# Patient Record
Sex: FEMALE | Race: WHITE | NOT HISPANIC OR LATINO | Employment: UNEMPLOYED | URBAN - METROPOLITAN AREA
[De-identification: names, ages, dates, MRNs, and addresses within clinical notes are randomized per-mention and may not be internally consistent; named-entity substitution may affect disease eponyms.]

---

## 2019-07-25 ENCOUNTER — NON-PROVIDER VISIT (OUTPATIENT)
Dept: URGENT CARE | Facility: CLINIC | Age: 34
End: 2019-07-25

## 2019-07-25 DIAGNOSIS — Z02.1 PRE-EMPLOYMENT DRUG SCREENING: Primary | ICD-10-CM

## 2019-07-25 LAB
AMP AMPHETAMINE: NORMAL
COC COCAINE: NORMAL
INT CON NEG: NORMAL
INT CON POS: NORMAL
MET METHAMPHETAMINES: NORMAL
OPI OPIATES: NORMAL
PCP PHENCYCLIDINE: NORMAL
POC DRUG COMMENT 753798-OCCUPATIONAL HEALTH: NORMAL
THC: POSITIVE

## 2019-07-25 PROCEDURE — 80305 DRUG TEST PRSMV DIR OPT OBS: CPT | Performed by: FAMILY MEDICINE

## 2019-07-25 PROCEDURE — 8279 PR URINE 6 PANEL - SEND TO LAB: Performed by: FAMILY MEDICINE

## 2019-08-05 ENCOUNTER — NON-PROVIDER VISIT (OUTPATIENT)
Dept: OCCUPATIONAL MEDICINE | Facility: CLINIC | Age: 34
End: 2019-08-05

## 2019-08-05 DIAGNOSIS — Z02.83 ENCOUNTER FOR DRUG SCREENING: ICD-10-CM

## 2019-08-05 PROCEDURE — 8911 PR MRO FEE: Performed by: NURSE PRACTITIONER

## 2020-10-26 ENCOUNTER — HOSPITAL ENCOUNTER (OUTPATIENT)
Dept: LAB | Facility: MEDICAL CENTER | Age: 35
End: 2020-10-26
Payer: COMMERCIAL

## 2020-10-26 LAB — COVID ORDER STATUS COVID19: NORMAL

## 2020-10-27 LAB
SARS-COV-2 RNA RESP QL NAA+PROBE: NOTDETECTED
SPECIMEN SOURCE: NORMAL

## 2023-07-24 ENCOUNTER — OFFICE VISIT (OUTPATIENT)
Dept: MEDICAL GROUP | Facility: LAB | Age: 38
End: 2023-07-24
Payer: COMMERCIAL

## 2023-07-24 VITALS
BODY MASS INDEX: 31.62 KG/M2 | SYSTOLIC BLOOD PRESSURE: 126 MMHG | RESPIRATION RATE: 14 BRPM | HEIGHT: 65 IN | OXYGEN SATURATION: 95 % | HEART RATE: 98 BPM | WEIGHT: 189.8 LBS | DIASTOLIC BLOOD PRESSURE: 64 MMHG | TEMPERATURE: 97.8 F

## 2023-07-24 DIAGNOSIS — E66.9 OBESITY (BMI 30-39.9): ICD-10-CM

## 2023-07-24 DIAGNOSIS — F10.10 ALCOHOL ABUSE: ICD-10-CM

## 2023-07-24 PROCEDURE — 3078F DIAST BP <80 MM HG: CPT | Performed by: NURSE PRACTITIONER

## 2023-07-24 PROCEDURE — 3074F SYST BP LT 130 MM HG: CPT | Performed by: NURSE PRACTITIONER

## 2023-07-24 PROCEDURE — 99204 OFFICE O/P NEW MOD 45 MIN: CPT | Performed by: NURSE PRACTITIONER

## 2023-07-24 ASSESSMENT — PATIENT HEALTH QUESTIONNAIRE - PHQ9: CLINICAL INTERPRETATION OF PHQ2 SCORE: 0

## 2023-07-24 NOTE — ASSESSMENT & PLAN NOTE
"At visit height 5'5\" and weight 189 pounds yielding BMI 31.58.   Educated on normal BMI. Denies weight changes. Would like to lose weight - no specific plan. Is interested in trying Ozempic. Due for labs.   Reocmmend lifestyle changes/portion control/plant based diet/adequate hydration.    May be beneficial to use food tracker franklyn/food journal.    Consider dietician evaluation/weight management program.   "

## 2023-07-24 NOTE — PROGRESS NOTES
"Subjective:     CC:    Chief Complaint   Patient presents with    Establish Care    Alcohol Problem     Concerns / questions     Medication Management     Ozempic      HISTORY OF THE PRESENT ILLNESS: Patient is a 38 y.o. female. This pleasant patient is here today to establish care and discuss the following:    Alcohol abuse  Reports that she has been cutting back on alcohol for the last 6 months ago. Reports that previously she was drinking about 5 shots and a beer nightly. She is now down to drinking 3 tall cans of Jose[employer] Harder Lemonade nightly. She would like to quit completely, is concerned about detox symptoms. Reports that she has had symptoms of tremors, sleep disturbance, anxiety, and poor balance in the past.     Obesity (BMI 30-39.9)  At visit height 5'5\" and weight 189 pounds yielding BMI 31.58.   Educated on normal BMI. Denies weight changes. Would like to lose weight - no specific plan. Is interested in trying Ozempic. Due for labs.   Reocmmend lifestyle changes/portion control/plant based diet/adequate hydration.    May be beneficial to use food tracker franklyn/food journal.    Consider dietician evaluation/weight management program.     ROS:   Gen: no fevers/chills, no changes in weight  Eyes: no changes in vision  ENT: no sore throat, no hearing loss, no bloody nose  Pulm: no sob, no cough  CV: no chest pain, no palpitations  GI: no nausea/vomiting, no diarrhea  : no dysuria  MSk: no myalgias  Skin: no rash  Neuro: no headaches, no numbness/tingling  Heme/Lymph: no easy bruising        - NOTE: All other systems reviewed and are negative, except as in HPI.      Objective:     Exam: /64 (BP Location: Right arm, Patient Position: Sitting, BP Cuff Size: Adult)   Pulse 98   Temp 36.6 °C (97.8 °F)   Resp 14   Ht 1.651 m (5' 5\")   Wt 86.1 kg (189 lb 12.8 oz)   SpO2 95%  Body mass index is 31.58 kg/m².    Constitutional: Alert, no distress, well-groomed.  Skin: Warm, dry, good turgor, no rashes in " visible areas.  Eye: Equal, round and reactive, conjunctiva clear, lids normal.  ENMT: Lips without lesions, good dentition, moist mucous membranes.  Neck: Trachea midline, no masses, no thyromegaly.  Respiratory: Unlabored respiratory effort, no cough.  MSK: Normal gait, moves all extremities.  Neuro: Grossly non-focal.   Psych: Alert and oriented x3, normal affect and mood.    Assessment & Plan:   38 y.o. female with the following -    1. Alcohol abuse  Congratulated patient on progress so far. Discussed safely weaning from alcohol. Labs as indicated. Discussed detox symptoms. Supportive care and indications for immediate follow-up discussed with patient. Instructed to return to clinic or nearest emergency department for any change in condition, further concerns, or worsening of symptoms.  - CBC WITH DIFFERENTIAL; Future  - Comp Metabolic Panel; Future  - Lipid Profile; Future  - HEMOGLOBIN A1C; Future  - TSH WITH REFLEX TO FT4; Future    2. Obesity (BMI 30-39.9)  Chronic, uncontrolled condition. BMI was assessed today and reviewed with patient as meeting criteria for the risk factor obesity.  Willingness to change as well as barriers were assessed. A collaborative plan was made with the patient and goals were set. Assistance was discussed, including self-help and more formal medical adjunctive treatments. Recommend healthy low-carb diet, 30-minutes of moderate exercise daily and avoiding sugars and high-fat foods.      Please note that this dictation was created using voice recognition software. I have made every reasonable attempt to correct obvious errors, but I expect that there are errors of grammar and possibly content that I did not discover before finalizing the note.

## 2023-07-24 NOTE — ASSESSMENT & PLAN NOTE
Reports that she has been cutting back on alcohol for the last 6 months ago. Reports that previously she was drinking about 5 shots and a beer nightly. She is now down to drinking 3 tall cans of Jose[employer] Harder Lemonade nightly. She would like to quit completely, is concerned about detox symptoms. Reports that she has had symptoms of tremors, sleep disturbance, anxiety, and poor balance in the past.

## 2023-08-18 ENCOUNTER — OFFICE VISIT (OUTPATIENT)
Dept: MEDICAL GROUP | Facility: MEDICAL CENTER | Age: 38
End: 2023-08-18
Payer: COMMERCIAL

## 2023-08-18 VITALS
WEIGHT: 192.9 LBS | SYSTOLIC BLOOD PRESSURE: 110 MMHG | HEART RATE: 111 BPM | BODY MASS INDEX: 32.14 KG/M2 | TEMPERATURE: 97.3 F | DIASTOLIC BLOOD PRESSURE: 64 MMHG | HEIGHT: 65 IN | OXYGEN SATURATION: 96 %

## 2023-08-18 DIAGNOSIS — F41.0 GENERALIZED ANXIETY DISORDER WITH PANIC ATTACKS: ICD-10-CM

## 2023-08-18 DIAGNOSIS — F41.1 GENERALIZED ANXIETY DISORDER WITH PANIC ATTACKS: ICD-10-CM

## 2023-08-18 DIAGNOSIS — Z00.00 HEALTHCARE MAINTENANCE: ICD-10-CM

## 2023-08-18 DIAGNOSIS — Z76.5 DRUG-SEEKING BEHAVIOR: Primary | ICD-10-CM

## 2023-08-18 DIAGNOSIS — Z30.41 ENCOUNTER FOR SURVEILLANCE OF CONTRACEPTIVE PILLS: ICD-10-CM

## 2023-08-18 PROBLEM — F43.10 PTSD (POST-TRAUMATIC STRESS DISORDER): Status: ACTIVE | Noted: 2023-08-18

## 2023-08-18 PROBLEM — Z30.40 ENCOUNTER FOR SURVEILLANCE OF CONTRACEPTIVES: Status: ACTIVE | Noted: 2023-08-18

## 2023-08-18 PROCEDURE — 99214 OFFICE O/P EST MOD 30 MIN: CPT | Performed by: STUDENT IN AN ORGANIZED HEALTH CARE EDUCATION/TRAINING PROGRAM

## 2023-08-18 PROCEDURE — 3078F DIAST BP <80 MM HG: CPT | Performed by: STUDENT IN AN ORGANIZED HEALTH CARE EDUCATION/TRAINING PROGRAM

## 2023-08-18 PROCEDURE — 3074F SYST BP LT 130 MM HG: CPT | Performed by: STUDENT IN AN ORGANIZED HEALTH CARE EDUCATION/TRAINING PROGRAM

## 2023-08-18 RX ORDER — CITALOPRAM 20 MG/1
20 TABLET ORAL DAILY
Qty: 30 TABLET | Refills: 2 | Status: SHIPPED | OUTPATIENT
Start: 2023-08-18

## 2023-08-18 RX ORDER — CITALOPRAM 20 MG/1
20 TABLET ORAL DAILY
COMMUNITY
End: 2023-08-18 | Stop reason: SDUPTHER

## 2023-08-18 RX ORDER — ACETAMINOPHEN AND CODEINE PHOSPHATE 120; 12 MG/5ML; MG/5ML
1 SOLUTION ORAL DAILY
Qty: 28 TABLET | Refills: 2 | Status: SHIPPED | OUTPATIENT
Start: 2023-08-18

## 2023-08-18 RX ORDER — ACETAMINOPHEN AND CODEINE PHOSPHATE 120; 12 MG/5ML; MG/5ML
1 SOLUTION ORAL DAILY
COMMUNITY
End: 2023-08-18 | Stop reason: SDUPTHER

## 2023-08-18 RX ORDER — HYDROXYZINE HYDROCHLORIDE 25 MG/1
25 TABLET, FILM COATED ORAL EVERY 8 HOURS PRN
Qty: 30 TABLET | Refills: 2 | Status: SHIPPED | OUTPATIENT
Start: 2023-08-18

## 2023-08-18 RX ORDER — IBUPROFEN 200 MG
200 TABLET ORAL EVERY 6 HOURS PRN
COMMUNITY

## 2023-08-18 RX ORDER — CLONAZEPAM 1 MG/1
TABLET ORAL 2 TIMES DAILY
COMMUNITY
End: 2023-08-21

## 2023-08-18 NOTE — PROGRESS NOTES
Subjective:     CC: Establish care and medication refill    HPI:   Virgen is a 38-year-old female.  She presents today to establish care with us and requesting refill for Klonopin and citalopram.    Patient reports that she has a long history of insomnia, PTSD generalized anxiety disorder with panic attacks for which she was following up with a telemetry psychiatry team who are prescribing her Klonopin 1 mg twice daily and Celexa 20 mg daily.  Reports that due to change in her insurance recently she is not able to continue following up with them and that prompted her to establish care with us.  States that she has been on clonazepam for more than 2 years and that is the only medication which helps her with her PTSD anxiety and panic attacks.  She states that for last 3 weeks she has been cutting down the pill and taking half the dose which is not at all helping and she really needs a refill for that.    I also asked her about her alcohol use because on chart review she was seen by another provider of [facility] a month back for alcohol use and per review sounded like she was worried about withdrawal symptoms as she is cutting down the amount of alcohol.  Today when I was discussing with her that benzodiazepine with alcohol is a concerning factor she told me that she quit alcohol 1-1/2 years back.  She also reported the same to my MA initially when she was roomed in.  When I confronted her that she was seen last month with one of the [facility] provider for alcohol use she suddenly changed her word and said that she might have used few drinks because one of her close friend passed away  When I further asked if it was few drinks or if she is still drinking on daily basis she apologizes for hiding it and stated that yes she has cut down but she is still drinking alcohol.    I discussed with her that I would like to send her to behavioral health and psychiatry but patient reported that she has trouble going out due to PTSD and  "she would like to see only one provider instead of going to a different speciality.            Health Maintenance: Completed    ROS:  ROS    Review of systems unremarkable except for concerns noted by patient or items listed.    Please see HPI for additional ROS.      Objective:     Exam:  /64 (BP Location: Left arm, Patient Position: Sitting, BP Cuff Size: Large adult long)   Pulse (!) 111   Temp 36.3 °C (97.3 °F) (Temporal)   Ht 1.651 m (5' 5\") Comment: Pt reported  Wt 87.5 kg (192 lb 14.4 oz)   LMP 08/12/2023   SpO2 96%   BMI 32.10 kg/m²  Body mass index is 32.1 kg/m².    Physical Exam  Constitutional:       Appearance: Normal appearance.   HENT:      Head: Normocephalic.   Eyes:      General: No scleral icterus.  Cardiovascular:      Rate and Rhythm: Normal rate and regular rhythm.      Pulses: Normal pulses.      Heart sounds: Normal heart sounds.   Pulmonary:      Effort: Pulmonary effort is normal.      Breath sounds: Normal breath sounds.   Musculoskeletal:      Right lower leg: No edema.      Left lower leg: No edema.   Skin:     General: Skin is warm.   Neurological:      Mental Status: She is alert and oriented to person, place, and time.   Psychiatric:         Mood and Affect: Mood normal.             Labs: reviewed    Assessment & Plan:     38 y.o. female with the following -       Drug seeking behavior     Patient giving inconsistent history. Chart review showed previous multiple ER visit for Opioid prescriptions few years back. Patient also reports she was out of state for last few years and getting Benzo prescription for PTSD and panic attacks. Reports that till last month she was getting prescription of 1 mg BID clonazepam from a telemedicine team.   I checked her PDMP apparently she got her last clonazepam prescription in 2022 May and that was as well a 0.5 mg dose not a 1 mg dose.  When I asked about it she got uncomfortable, tried telling something about being seen at [facility] and how " has been breaking the pills for days to maintain taking it. Confusing and inconsistent .     I told her that to have a good patient - doctor relationship, we have to trust each other and that this is not helping , she started apologizing and requesting to continue to get care with us and that she needs a PAP smear as well.     I have asked her to make an appointment with me in next few weeks for PAP. I informed her that I do not feel comfortable prescribing Benzodiazepine . I can restart celexa 20 mg if she was on it prior which she agrees with. I I informed her that I would send referral to behavior health and that she needs to get evaluated by them for further management. She agrees with this plan and asked for a non Benzo medication to help her anxiety.  I prescribed atarax 25 mg TID as needed.     PTSD   STEWART with panic attacks     Patient reports that she has history of PTSD anxiety and panic attacks.  I do not see any documented history of PTSD.  Based on chart review patient does have a history of drug-seeking behavior in the past as well.  Chart review back in [id number] she had multiple ED visits for opioid pain medication prescription request.  PDMP reviewed.  History and information provided by patient did not correlate with the PDMP information.  I discussed this with patient.  I informed her that I am not going to give her benzodiazepine prescription.  I can restart her SSRI and send referral to behavioral health. Patient agrees with the plan.        Alcohol use :  Patient reported that she quit alcohol 1.5 year back and has not had drink since then but after going over her chart when confronted about recent visit with [facility] provided in July 2023 for alcohol use, patient apologized saying she is drinking alcohol sometimes. Unclear as not able to get consistent history .   Plan:  Referral to behavior health         Return in about 3 months (around 11/18/2023).    Please note that this dictation was  created using voice recognition software. I have made every reasonable attempt to correct obvious errors, but I expect that there are errors of grammar and possibly content that I did not discover before finalizing the note.

## 2023-08-21 PROBLEM — Z76.5 DRUG-SEEKING BEHAVIOR: Status: ACTIVE | Noted: 2023-08-21

## 2024-02-29 ENCOUNTER — HOSPITAL ENCOUNTER (OUTPATIENT)
Dept: LAB | Facility: MEDICAL CENTER | Age: 39
End: 2024-02-29
Attending: NURSE PRACTITIONER
Payer: COMMERCIAL

## 2024-02-29 DIAGNOSIS — F10.10 ALCOHOL ABUSE: ICD-10-CM

## 2024-02-29 LAB
ALBUMIN SERPL BCP-MCNC: 3.8 G/DL (ref 3.2–4.9)
ALBUMIN/GLOB SERPL: 1.5 G/DL
ALP SERPL-CCNC: 64 U/L (ref 30–99)
ALT SERPL-CCNC: 8 U/L (ref 2–50)
ANION GAP SERPL CALC-SCNC: 12 MMOL/L (ref 7–16)
AST SERPL-CCNC: 13 U/L (ref 12–45)
BASOPHILS # BLD AUTO: 0.6 % (ref 0–1.8)
BASOPHILS # BLD: 0.04 K/UL (ref 0–0.12)
BILIRUB SERPL-MCNC: 0.7 MG/DL (ref 0.1–1.5)
BUN SERPL-MCNC: 7 MG/DL (ref 8–22)
CALCIUM ALBUM COR SERPL-MCNC: 8.9 MG/DL (ref 8.5–10.5)
CALCIUM SERPL-MCNC: 8.7 MG/DL (ref 8.5–10.5)
CHLORIDE SERPL-SCNC: 104 MMOL/L (ref 96–112)
CHOLEST SERPL-MCNC: 218 MG/DL (ref 100–199)
CO2 SERPL-SCNC: 23 MMOL/L (ref 20–33)
CREAT SERPL-MCNC: 0.76 MG/DL (ref 0.5–1.4)
EOSINOPHIL # BLD AUTO: 0.23 K/UL (ref 0–0.51)
EOSINOPHIL NFR BLD: 3.3 % (ref 0–6.9)
ERYTHROCYTE [DISTWIDTH] IN BLOOD BY AUTOMATED COUNT: 51.9 FL (ref 35.9–50)
EST. AVERAGE GLUCOSE BLD GHB EST-MCNC: 100 MG/DL
GFR SERPLBLD CREATININE-BSD FMLA CKD-EPI: 103 ML/MIN/1.73 M 2
GLOBULIN SER CALC-MCNC: 2.6 G/DL (ref 1.9–3.5)
GLUCOSE SERPL-MCNC: 96 MG/DL (ref 65–99)
HBA1C MFR BLD: 5.1 % (ref 4–5.6)
HCT VFR BLD AUTO: 41.6 % (ref 37–47)
HDLC SERPL-MCNC: 54 MG/DL
HGB BLD-MCNC: 14.4 G/DL (ref 12–16)
IMM GRANULOCYTES # BLD AUTO: 0.01 K/UL (ref 0–0.11)
IMM GRANULOCYTES NFR BLD AUTO: 0.1 % (ref 0–0.9)
LDLC SERPL CALC-MCNC: 126 MG/DL
LYMPHOCYTES # BLD AUTO: 2.55 K/UL (ref 1–4.8)
LYMPHOCYTES NFR BLD: 36.3 % (ref 22–41)
MCH RBC QN AUTO: 35.2 PG (ref 27–33)
MCHC RBC AUTO-ENTMCNC: 34.6 G/DL (ref 32.2–35.5)
MCV RBC AUTO: 101.7 FL (ref 81.4–97.8)
MONOCYTES # BLD AUTO: 0.41 K/UL (ref 0–0.85)
MONOCYTES NFR BLD AUTO: 5.8 % (ref 0–13.4)
NEUTROPHILS # BLD AUTO: 3.78 K/UL (ref 1.82–7.42)
NEUTROPHILS NFR BLD: 53.9 % (ref 44–72)
NRBC # BLD AUTO: 0 K/UL
NRBC BLD-RTO: 0 /100 WBC (ref 0–0.2)
PLATELET # BLD AUTO: 286 K/UL (ref 164–446)
PMV BLD AUTO: 10.7 FL (ref 9–12.9)
POTASSIUM SERPL-SCNC: 3.9 MMOL/L (ref 3.6–5.5)
PROT SERPL-MCNC: 6.4 G/DL (ref 6–8.2)
RBC # BLD AUTO: 4.09 M/UL (ref 4.2–5.4)
SODIUM SERPL-SCNC: 139 MMOL/L (ref 135–145)
TRIGL SERPL-MCNC: 188 MG/DL (ref 0–149)
TSH SERPL DL<=0.005 MIU/L-ACNC: 2.58 UIU/ML (ref 0.38–5.33)
WBC # BLD AUTO: 7 K/UL (ref 4.8–10.8)

## 2024-02-29 PROCEDURE — 80061 LIPID PANEL: CPT

## 2024-02-29 PROCEDURE — 83036 HEMOGLOBIN GLYCOSYLATED A1C: CPT

## 2024-02-29 PROCEDURE — 36415 COLL VENOUS BLD VENIPUNCTURE: CPT

## 2024-02-29 PROCEDURE — 85025 COMPLETE CBC W/AUTO DIFF WBC: CPT

## 2024-02-29 PROCEDURE — 84443 ASSAY THYROID STIM HORMONE: CPT

## 2024-02-29 PROCEDURE — 80053 COMPREHEN METABOLIC PANEL: CPT

## 2024-03-05 SDOH — ECONOMIC STABILITY: FOOD INSECURITY: WITHIN THE PAST 12 MONTHS, THE FOOD YOU BOUGHT JUST DIDN'T LAST AND YOU DIDN'T HAVE MONEY TO GET MORE.: NEVER TRUE

## 2024-03-05 SDOH — ECONOMIC STABILITY: TRANSPORTATION INSECURITY
IN THE PAST 12 MONTHS, HAS THE LACK OF TRANSPORTATION KEPT YOU FROM MEDICAL APPOINTMENTS OR FROM GETTING MEDICATIONS?: NO

## 2024-03-05 SDOH — ECONOMIC STABILITY: HOUSING INSECURITY
IN THE LAST 12 MONTHS, WAS THERE A TIME WHEN YOU DID NOT HAVE A STEADY PLACE TO SLEEP OR SLEPT IN A SHELTER (INCLUDING NOW)?: NO

## 2024-03-05 SDOH — ECONOMIC STABILITY: INCOME INSECURITY: HOW HARD IS IT FOR YOU TO PAY FOR THE VERY BASICS LIKE FOOD, HOUSING, MEDICAL CARE, AND HEATING?: NOT VERY HARD

## 2024-03-05 SDOH — HEALTH STABILITY: PHYSICAL HEALTH: ON AVERAGE, HOW MANY DAYS PER WEEK DO YOU ENGAGE IN MODERATE TO STRENUOUS EXERCISE (LIKE A BRISK WALK)?: 3 DAYS

## 2024-03-05 SDOH — ECONOMIC STABILITY: INCOME INSECURITY: IN THE LAST 12 MONTHS, WAS THERE A TIME WHEN YOU WERE NOT ABLE TO PAY THE MORTGAGE OR RENT ON TIME?: NO

## 2024-03-05 SDOH — ECONOMIC STABILITY: FOOD INSECURITY: WITHIN THE PAST 12 MONTHS, YOU WORRIED THAT YOUR FOOD WOULD RUN OUT BEFORE YOU GOT MONEY TO BUY MORE.: NEVER TRUE

## 2024-03-05 SDOH — HEALTH STABILITY: PHYSICAL HEALTH: ON AVERAGE, HOW MANY MINUTES DO YOU ENGAGE IN EXERCISE AT THIS LEVEL?: 50 MIN

## 2024-03-05 SDOH — ECONOMIC STABILITY: TRANSPORTATION INSECURITY
IN THE PAST 12 MONTHS, HAS LACK OF TRANSPORTATION KEPT YOU FROM MEETINGS, WORK, OR FROM GETTING THINGS NEEDED FOR DAILY LIVING?: NO

## 2024-03-05 SDOH — ECONOMIC STABILITY: HOUSING INSECURITY: IN THE LAST 12 MONTHS, HOW MANY PLACES HAVE YOU LIVED?: 1

## 2024-03-05 SDOH — ECONOMIC STABILITY: TRANSPORTATION INSECURITY
IN THE PAST 12 MONTHS, HAS LACK OF RELIABLE TRANSPORTATION KEPT YOU FROM MEDICAL APPOINTMENTS, MEETINGS, WORK OR FROM GETTING THINGS NEEDED FOR DAILY LIVING?: NO

## 2024-03-05 SDOH — HEALTH STABILITY: MENTAL HEALTH
STRESS IS WHEN SOMEONE FEELS TENSE, NERVOUS, ANXIOUS, OR CAN'T SLEEP AT NIGHT BECAUSE THEIR MIND IS TROUBLED. HOW STRESSED ARE YOU?: ONLY A LITTLE

## 2024-03-05 ASSESSMENT — SOCIAL DETERMINANTS OF HEALTH (SDOH)
HOW HARD IS IT FOR YOU TO PAY FOR THE VERY BASICS LIKE FOOD, HOUSING, MEDICAL CARE, AND HEATING?: NOT VERY HARD
HOW OFTEN DO YOU GET TOGETHER WITH FRIENDS OR RELATIVES?: TWICE A WEEK
IN A TYPICAL WEEK, HOW MANY TIMES DO YOU TALK ON THE PHONE WITH FAMILY, FRIENDS, OR NEIGHBORS?: MORE THAN THREE TIMES A WEEK
DO YOU BELONG TO ANY CLUBS OR ORGANIZATIONS SUCH AS CHURCH GROUPS UNIONS, FRATERNAL OR ATHLETIC GROUPS, OR SCHOOL GROUPS?: NO
HOW OFTEN DO YOU HAVE SIX OR MORE DRINKS ON ONE OCCASION: NEVER
HOW OFTEN DO YOU GET TOGETHER WITH FRIENDS OR RELATIVES?: TWICE A WEEK
HOW OFTEN DO YOU ATTEND CHURCH OR RELIGIOUS SERVICES?: PATIENT DECLINED
IN A TYPICAL WEEK, HOW MANY TIMES DO YOU TALK ON THE PHONE WITH FAMILY, FRIENDS, OR NEIGHBORS?: MORE THAN THREE TIMES A WEEK
HOW MANY DRINKS CONTAINING ALCOHOL DO YOU HAVE ON A TYPICAL DAY WHEN YOU ARE DRINKING: 3 OR 4
WITHIN THE PAST 12 MONTHS, YOU WORRIED THAT YOUR FOOD WOULD RUN OUT BEFORE YOU GOT THE MONEY TO BUY MORE: NEVER TRUE
HOW OFTEN DO YOU ATTENT MEETINGS OF THE CLUB OR ORGANIZATION YOU BELONG TO?: PATIENT DECLINED
HOW OFTEN DO YOU HAVE A DRINK CONTAINING ALCOHOL: 2-3 TIMES A WEEK
HOW OFTEN DO YOU ATTENT MEETINGS OF THE CLUB OR ORGANIZATION YOU BELONG TO?: PATIENT DECLINED
DO YOU BELONG TO ANY CLUBS OR ORGANIZATIONS SUCH AS CHURCH GROUPS UNIONS, FRATERNAL OR ATHLETIC GROUPS, OR SCHOOL GROUPS?: NO
HOW OFTEN DO YOU ATTEND CHURCH OR RELIGIOUS SERVICES?: PATIENT DECLINED

## 2024-03-05 ASSESSMENT — LIFESTYLE VARIABLES
AUDIT-C TOTAL SCORE: 4
HOW OFTEN DO YOU HAVE SIX OR MORE DRINKS ON ONE OCCASION: NEVER
HOW MANY STANDARD DRINKS CONTAINING ALCOHOL DO YOU HAVE ON A TYPICAL DAY: 3 OR 4
SKIP TO QUESTIONS 9-10: 0
HOW OFTEN DO YOU HAVE A DRINK CONTAINING ALCOHOL: 2-3 TIMES A WEEK

## 2024-03-06 ENCOUNTER — OFFICE VISIT (OUTPATIENT)
Dept: MEDICAL GROUP | Facility: LAB | Age: 39
End: 2024-03-06
Payer: COMMERCIAL

## 2024-03-06 VITALS
HEART RATE: 118 BPM | OXYGEN SATURATION: 95 % | DIASTOLIC BLOOD PRESSURE: 82 MMHG | HEIGHT: 65 IN | RESPIRATION RATE: 14 BRPM | TEMPERATURE: 98.2 F | BODY MASS INDEX: 32.96 KG/M2 | WEIGHT: 197.8 LBS | SYSTOLIC BLOOD PRESSURE: 126 MMHG

## 2024-03-06 DIAGNOSIS — E66.09 CLASS 1 OBESITY DUE TO EXCESS CALORIES WITH SERIOUS COMORBIDITY AND BODY MASS INDEX (BMI) OF 32.0 TO 32.9 IN ADULT: ICD-10-CM

## 2024-03-06 DIAGNOSIS — E78.5 DYSLIPIDEMIA: ICD-10-CM

## 2024-03-06 DIAGNOSIS — G47.39 SLEEP APNEA-LIKE BEHAVIOR: ICD-10-CM

## 2024-03-06 DIAGNOSIS — R06.83 SNORING: ICD-10-CM

## 2024-03-06 DIAGNOSIS — G47.19 EXCESSIVE DAYTIME SLEEPINESS: ICD-10-CM

## 2024-03-06 PROBLEM — E66.811 CLASS 1 OBESITY DUE TO EXCESS CALORIES WITH SERIOUS COMORBIDITY AND BODY MASS INDEX (BMI) OF 32.0 TO 32.9 IN ADULT: Status: ACTIVE | Noted: 2024-03-06

## 2024-03-06 PROCEDURE — 99214 OFFICE O/P EST MOD 30 MIN: CPT | Performed by: NURSE PRACTITIONER

## 2024-03-06 PROCEDURE — 3074F SYST BP LT 130 MM HG: CPT | Performed by: NURSE PRACTITIONER

## 2024-03-06 PROCEDURE — 3079F DIAST BP 80-89 MM HG: CPT | Performed by: NURSE PRACTITIONER

## 2024-03-06 ASSESSMENT — PATIENT HEALTH QUESTIONNAIRE - PHQ9: CLINICAL INTERPRETATION OF PHQ2 SCORE: 0

## 2024-03-06 ASSESSMENT — FIBROSIS 4 INDEX: FIB4 SCORE: 0.59

## 2024-03-07 NOTE — ASSESSMENT & PLAN NOTE
"At visit height 5'5\" and weight 197 pounds yielding BMI 32.92.   Educated on normal BMI. Denies weight changes. Would like to lose weight - no specific plan. Educated on lifestyle changes/portion control/plant based diet/adequate hydration.  May be beneficial to use food tracker franklyn/food journal.  Consider dietician evaluation/weight management program. Patient agrees that dietician/weight management program may be beneficial.  Comorbidities: dyslipidemia, sleep-apnea like behavior  "

## 2024-03-07 NOTE — ASSESSMENT & PLAN NOTE
Chronic condition. Reviewed labs. Patient currently managing with lifestyle modifications. Denies chest pain, shortness of breath, heart palpitations.    Lab Results   Component Value Date/Time    CHOLSTRLTOT 218 (H) 02/29/2024 10:28 AM     (H) 02/29/2024 10:28 AM    HDL 54 02/29/2024 10:28 AM    TRIGLYCERIDE 188 (H) 02/29/2024 10:28 AM

## 2024-03-07 NOTE — PROGRESS NOTES
"Subjective:     CC:    Chief Complaint   Patient presents with    Establish Care    Lab Results     HISTORY OF THE PRESENT ILLNESS: Patient is a 39 y.o. female. This pleasant patient is here today to establish care and discuss the following:    Class 1 obesity due to excess calories with serious comorbidity and body mass index (BMI) of 32.0 to 32.9 in adult  At visit height 5'5\" and weight 197 pounds yielding BMI 32.92.   Educated on normal BMI. Denies weight changes. Would like to lose weight - no specific plan. Educated on lifestyle changes/portion control/plant based diet/adequate hydration.  May be beneficial to use food tracker franklyn/food journal.  Consider dietician evaluation/weight management program. Patient agrees that dietician/weight management program may be beneficial.  Comorbidities: dyslipidemia, sleep-apnea like behavior    Dyslipidemia  Chronic condition. Reviewed labs. Patient currently managing with lifestyle modifications. Denies chest pain, shortness of breath, heart palpitations.    Lab Results   Component Value Date/Time    CHOLSTRLTOT 218 (H) 02/29/2024 10:28 AM     (H) 02/29/2024 10:28 AM    HDL 54 02/29/2024 10:28 AM    TRIGLYCERIDE 188 (H) 02/29/2024 10:28 AM        Snoring  Patient reports that she has had difficulty with snoring and waking in the night since childhood. She has been told that her snoring has gotten worse lately and partner has told her that she stops breathing when she sleeps. She is interested in a sleep study at this time.    STOPBANG - Sleep Apnea Screening      Flowsheet Row Most Recent Value   S - Have you been told that you SNORE? Yes   T - Are you often TIRED during the day? Yes   O - Do you know if you stop breathing or has anyone witnessed you stop breathing while you were asleep? (OBSTRUCTION) Yes   P - Do you have high blood PRESSURE or on medication to control high blood pressure? No   B - Is your Body Mass Index greater than 35? (BMI) No   A - Are you " "50 years old or older? (AGE) No   N - Are you a male with a NECK circumference greater than 17 inches, or a female with a neck circumference greater than 16 inches? No   G - Are you male? (GENDER) No   STOPBANG Total Score 3   JOSE ENRIQUE Risk Intermediate Risk          ROS:   Gen: no fevers/chills, no changes in weight  Eyes: no changes in vision  ENT: no sore throat, no hearing loss, no bloody nose  Pulm: no sob, no cough  CV: no chest pain, no palpitations  GI: no nausea/vomiting, no diarrhea  : no dysuria  MSk: no myalgias  Skin: no rash  Neuro: no headaches, no numbness/tingling  Heme/Lymph: no easy bruising        - NOTE: All other systems reviewed and are negative, except as in HPI.      Objective:     Exam: /82 (BP Location: Right arm, Patient Position: Sitting, BP Cuff Size: Adult)   Pulse (!) 118   Temp 36.8 °C (98.2 °F)   Resp 14   Ht 1.651 m (5' 5\")   Wt 89.7 kg (197 lb 12.8 oz)   SpO2 95%  Body mass index is 32.92 kg/m².    Constitutional: Alert, no distress, well-groomed.  Skin: Warm, dry, good turgor, no rashes in visible areas.  Eye: Equal, round and reactive, conjunctiva clear, lids normal.  ENMT: Lips without lesions, good dentition, moist mucous membranes.  Neck: Trachea midline, no masses, no thyromegaly.  Respiratory: Unlabored respiratory effort, no cough.  MSK: Normal gait, moves all extremities.  Neuro: Grossly non-focal.   Psych: Alert and oriented x3, normal affect and mood.    Assessment & Plan:   39 y.o. female with the following -    1. Class 1 obesity due to excess calories with serious comorbidity and body mass index (BMI) of 32.0 to 32.9 in adult  5. Sleep apnea-like behavior  Chronic, uncontrolled condition. BMI was assessed today and reviewed with patient as meeting criteria for the risk factor obesity.  Willingness to change as well as barriers were assessed. A collaborative plan was made with the patient and goals were set. Assistance was discussed, including self-help and " more formal medical adjunctive treatments. Patient to take medication as prescribed. Side effects of medication prescribed today were discussed with the patient including how to take the medication and proper dosage. Discussed repercussions of not taking the medication as prescribed. Instructed to call the office should she have any negative side effects or problems with the medication. Recommend healthy low-carb diet, 30-minutes of moderate exercise daily and avoiding sugars and high-fat foods.    - Tirzepatide-Weight Management 2.5 MG/0.5ML Solution Auto-injector; Inject 2.5 mg under the skin every 7 days.  Dispense: 3 mL; Refill: 1    2. Dyslipidemia  Chronic condition. Labs as indicated.  Continue lifestyle modifications.    3. Snoring  4. Excessive daytime sleepiness  Sleep study ordered. Discussed sleep positioning, alcohol avoidance, and alternative therapies. Emphasized importance of maintaining good sleep hygiene including: no caffeine after 3pm, no napping, using bedroom only for sleep or sex, no TV or phones before bed. Continue to monitor.  - Polysomnography, 4 or More; Future  - Referral to Pulmonary and Sleep Medicine  - Tirzepatide-Weight Management 2.5 MG/0.5ML Solution Auto-injector; Inject 2.5 mg under the skin every 7 days.  Dispense: 3 mL; Refill: 1    Please note that this dictation was created using voice recognition software. I have made every reasonable attempt to correct obvious errors, but I expect that there are errors of grammar and possibly content that I did not discover before finalizing the note.

## 2024-03-07 NOTE — PATIENT INSTRUCTIONS
SLEEP STUDY INSTRUCTIONS    1. Our main concern is to provide the best test and evaluation of your sleep and your cooperation in following the guidelines is very necessary.    2. We have no facilities for family members or guests at the sleep center. Special arrangements will be made for children requiring overnight sleep studies.    3. Unless otherwise instructed, AVOID alcoholic beverages on the day of your sleep study.    4. DO NOT drink coffee or caffeine-containing beverages after 12:00 noon on the day of your sleep study.    5. There is NO smoking at the sleep center.    6. Try to maintain a usual daytime schedule prior to the study (avoid unusual physical activity or meals).    7. DO NOT take a nap on the day of your study.    8. This is an outpatient procedure (test); therefore, nursing services, medications, and meals ARE NOT provided. If you take medications, bring them with you and take them on the schedule you do at home.    9. Please fill your sleep aid prescription (Ambien or Lunesta) and bring to your sleep study. Even patients who normally have no problem going to sleep often need a sleep aid in this different environment.    10. We ask that you wear conventional sleep attire (pajamas or sweats) for the sleep study. We discourage patients from wearing only their underwear to bed. We recommend two-piece pajamas as the techs will need to apply sensors to your stomach.    11. Please shampoo your hair the day of the sleep study. Please DO NOT use any other hair or skin products before your arrival (e.g., mousse, gel, hair or body spray, perfume, body lotion etc.) NOTE: Women should not wear heavy makeup prior to arrival as some wires are taped to the face area.    12. The technician will be applying several small electrodes to the scalp, eye area, chin, chest, and legs, plus respiratory effort belts around the chest. Also, there will be a device placed directly under the nose. (THIS WILL NOT OBSTRUCT  YOUR BREATHING.) This is a painless procedure and the skin is not broken.    13. The test is generally completed in six to eight hours; We are usually done between 6 - 7 a.m., unless you are scheduled for a nap study. You may need to come back another night for a second study to complete your treatment plan.    14. Patients who are scheduled for an MSLT (nap study) will stay at the sleep center for the day following their nighttime study. You will be notified if a nap study was ordered for you at the time the night study is scheduled. Generally, patients having a nap study will leave the sleep center by 4 p.m.    15. You will need to bring food for the following day if you are scheduled for a nap study. A refrigerator and microwave are available.    16. A bathroom is available for your use.    17. We are able to adjust the room temperature for your comfort. Please let the technologist know if you are uncomfortable during the study.    18. If you sleep better with a special pillow or stuffed animal, you may bring it along. Service animals are the only live animals permitted.    19. Cable T.V. is available.    20. You will be scheduled for a follow-up appointment three to five days after the sleep study to review your results.    21. A copy of your sleep study is sent to the referring physician approximately two weeks after your study.    22. Any questions can be directed to our staff at [phone].    23. If CPAP therapy is applied, a home unit will be ordered for you through the [employer] company. You will be contacted to schedule delivery after insurance authorization.

## 2024-03-07 NOTE — ASSESSMENT & PLAN NOTE
Patient reports that she has had difficulty with snoring and waking in the night since childhood. She has been told that her snoring has gotten worse lately and partner has told her that she stops breathing when she sleeps. She is interested in a sleep study at this time.    STOPBANG - Sleep Apnea Screening      Flowsheet Row Most Recent Value   S - Have you been told that you SNORE? Yes   T - Are you often TIRED during the day? Yes   O - Do you know if you stop breathing or has anyone witnessed you stop breathing while you were asleep? (OBSTRUCTION) Yes   P - Do you have high blood PRESSURE or on medication to control high blood pressure? No   B - Is your Body Mass Index greater than 35? (BMI) No   A - Are you 50 years old or older? (AGE) No   N - Are you a male with a NECK circumference greater than 17 inches, or a female with a neck circumference greater than 16 inches? No   G - Are you male? (GENDER) No   STOPBANG Total Score 3   JOSE ENRIQUE Risk Intermediate Risk

## 2024-04-04 ENCOUNTER — TELEPHONE (OUTPATIENT)
Dept: HEALTH INFORMATION MANAGEMENT | Facility: OTHER | Age: 39
End: 2024-04-04
Payer: COMMERCIAL

## 2024-04-15 ENCOUNTER — SLEEP STUDY (OUTPATIENT)
Dept: SLEEP MEDICINE | Facility: MEDICAL CENTER | Age: 39
End: 2024-04-15
Attending: NURSE PRACTITIONER
Payer: COMMERCIAL

## 2024-04-15 DIAGNOSIS — R06.83 SNORING: ICD-10-CM

## 2024-04-15 DIAGNOSIS — G47.19 EXCESSIVE DAYTIME SLEEPINESS: ICD-10-CM

## 2024-04-15 PROCEDURE — 95810 POLYSOM 6/> YRS 4/> PARAM: CPT | Performed by: STUDENT IN AN ORGANIZED HEALTH CARE EDUCATION/TRAINING PROGRAM

## 2024-04-17 NOTE — PROCEDURES
Patient: CHRISTIANO WARNER  ID: [id number] Date: 4/15/2024   MONTAGE: Standard  STUDY TYPE: Diagnostic  RECORDING TECHNIQUE:   After the scalp was prepared, gold plated electrodes were applied to the scalp according to the International 10-20 System. EEG (electroencephalogram) was continuously monitored from the O1-M2, O2-M1, C3-M2, C4-M1, F3-M2, and F4-M1. EOGs (electrooculograms) were monitored by electrodes placed at the left and right outer canthi. Chin EMG (electromyogram) was monitored by electrodes placed on the mentalis and sub-mentalis muscles. Nasal and oral airflow were monitored using a triple port thermocouple as well as oronasal pressure transducer. Respiratory effort was measured by inductive plethysmography technology employing abdominal and thoracic belts. Blood oxygen saturation and pulse were monitored by pulse oximetry. Heart rhythm was monitored by surface electrocardiogram. Leg EMG was studied using surface electrodes placed on left and right anterior tibialis. A microphone was used to monitor tracheal sounds and snoring. Body position was monitored and documented by technician observation.   SCORING CRITERIA:   A modification of the AASM manual for scoring of sleep and associated events was used. Obstructive apneas were scored by cessation of airflow for at least 10 seconds with continuing respiratory effort. Central apneas were scored by cessation of airflow for at least 10 seconds with no respiratory effort. Hypopneas were scored by a 30% or more reduction in airflow for at least 10 seconds accompanied by arterial oxygen desaturation of 3% or an arousal. For CMS (Medicare) patients, per AASM rule 1B, hypopneas are scored by 30% with mild reduction in airflow for at least 10 seconds accompanied by arterial saturation decreased at 4%.    Study start time was 10:39:56 PM. Diagnostic recording time was 7h 5.5m with a total sleep time of 5h 10.5m resulting in a sleep efficiency of 72.97%%. Sleep  latency from the start of the study was 11 minutes and the latency from sleep to REM was 281 minutes. In total,71 arousals were scored for an arousal index of 13.7.  Respiratory:  There were a total of 22 apneas consisting of 22 obstructive apneas, 0 mixed apneas, and 0 central apneas. A total of 76 hypopneas were scored. The apnea index was 4.25 per hour and the hypopnea index was 14.69 per hour resulting in an overall AHI of 18.94. AHI during REM was 54.4 and AHI while supine was 51.32.  Oximetry:  There was a mean oxygen saturation of 94.0%. The minimum oxygen saturation in NREM was 87.0 % and in REM was 89.0%. The patient spent 0.5 minutes of TST with SaO2 <88%.  Cardiac:  The highest heart rate seen while awake was 109 BPM while the highest heart rate during sleep was 102 BPM with an average sleeping heart rate of 77 BPM.  Limb Movements:  There were a total of 8 PLMs during sleep which resulted in a PLMS index of 1.5. Of these, 24 were associated with arousals which resulted in a PLMS arousal index of 4.6.    Impression:  1.  Moderate obstructive sleep apnea with an overall AHI of 19 events an hour  2.  Respiratory events were worse during REM sleep with a REM AHI of 54 events an hour, and supine sleep with a supine sleep AHI of 51 events an hour  3.  Supine REM sleep was seen during night of study  4.  Sleep was slightly fragmented with a sleep efficiency of 73%   5.  Due to sleep fragmentation patient did not meet criteria for split-night protocol as majority of respiratory events occurred in second portion of night    Recommendations:  I recommend the patient  consider return for a CPAP/BiPAP titration.   Patient may be a candidate for empiric home auto CPAP therapy minimum pressure 5 cmH2O and maximum pressure 15 cmH2O.    In some cases alternative treatment options may be proven effective in resolving sleep apnea. These options include upper airway surgery, the use of a dental orthotic, weight loss, or  positional therapy. Clinical correlation is required. In general patients with sleep apnea are advised to avoid alcohol, sedatives and not to operate a motor vehicle while drowsy.  Untreated sleep apnea increases the risk for cardiovascular and neurovascular disease.

## 2024-04-29 ENCOUNTER — TELEPHONE (OUTPATIENT)
Dept: SCHEDULING | Facility: IMAGING CENTER | Age: 39
End: 2024-04-29
Payer: COMMERCIAL

## 2024-04-29 NOTE — TELEPHONE ENCOUNTER
Caller: Virgen Das     Topic/issue: PATIENT STATS SHE RECEIVED A MESSAGE TO COME IN FOR CPAP ADJUSTMENT. PATIENT HAS NEW PATIENT APPT SCHEDULED IN 09/2024.     Callback Number: [phone]         THANK YOU     OZZY MARTINS

## 2024-04-30 NOTE — TELEPHONE ENCOUNTER
Spoke with patient about message she received about needing to come in for CPAP adjustment. Pt informed me someone from [facility] called her [phone] and told her that, looked in chart and found no documentation of call with that office. Explained to patient that we would have to wait until she has established with our office before we could get her set up with CPAP. Patient has a new patient appointment 9/10/24.

## 2024-06-26 ENCOUNTER — TELEPHONE (OUTPATIENT)
Dept: HEALTH INFORMATION MANAGEMENT | Facility: OTHER | Age: 39
End: 2024-06-26
Payer: COMMERCIAL

## 2024-10-15 ENCOUNTER — HOSPITAL ENCOUNTER (OUTPATIENT)
Facility: MEDICAL CENTER | Age: 39
End: 2024-10-15
Attending: PHYSICIAN ASSISTANT
Payer: COMMERCIAL

## 2024-10-15 ENCOUNTER — OFFICE VISIT (OUTPATIENT)
Dept: URGENT CARE | Facility: PHYSICIAN GROUP | Age: 39
End: 2024-10-15
Payer: COMMERCIAL

## 2024-10-15 VITALS
BODY MASS INDEX: 34.49 KG/M2 | HEIGHT: 65 IN | HEART RATE: 92 BPM | RESPIRATION RATE: 18 BRPM | OXYGEN SATURATION: 97 % | DIASTOLIC BLOOD PRESSURE: 78 MMHG | TEMPERATURE: 97.1 F | SYSTOLIC BLOOD PRESSURE: 112 MMHG | WEIGHT: 207 LBS

## 2024-10-15 DIAGNOSIS — N30.00 ACUTE CYSTITIS WITHOUT HEMATURIA: ICD-10-CM

## 2024-10-15 LAB
APPEARANCE UR: NORMAL
BILIRUB UR STRIP-MCNC: NORMAL MG/DL
COLOR UR AUTO: NORMAL
GLUCOSE UR STRIP.AUTO-MCNC: 250 MG/DL
KETONES UR STRIP.AUTO-MCNC: 15 MG/DL
LEUKOCYTE ESTERASE UR QL STRIP.AUTO: NORMAL
NITRITE UR QL STRIP.AUTO: POSITIVE
PH UR STRIP.AUTO: 5.5 [PH] (ref 5–8)
PROT UR QL STRIP: 100 MG/DL
RBC UR QL AUTO: NORMAL
SP GR UR STRIP.AUTO: 1.01
UROBILINOGEN UR STRIP-MCNC: 8 MG/DL

## 2024-10-15 PROCEDURE — 87077 CULTURE AEROBIC IDENTIFY: CPT

## 2024-10-15 PROCEDURE — 3074F SYST BP LT 130 MM HG: CPT | Performed by: PHYSICIAN ASSISTANT

## 2024-10-15 PROCEDURE — 87186 SC STD MICRODIL/AGAR DIL: CPT

## 2024-10-15 PROCEDURE — 99213 OFFICE O/P EST LOW 20 MIN: CPT | Performed by: PHYSICIAN ASSISTANT

## 2024-10-15 PROCEDURE — 81002 URINALYSIS NONAUTO W/O SCOPE: CPT | Performed by: PHYSICIAN ASSISTANT

## 2024-10-15 PROCEDURE — 87086 URINE CULTURE/COLONY COUNT: CPT

## 2024-10-15 PROCEDURE — 3078F DIAST BP <80 MM HG: CPT | Performed by: PHYSICIAN ASSISTANT

## 2024-10-15 RX ORDER — SULFAMETHOXAZOLE AND TRIMETHOPRIM 800; 160 MG/1; MG/1
1 TABLET ORAL 2 TIMES DAILY
Qty: 10 TABLET | Refills: 0 | Status: SHIPPED | OUTPATIENT
Start: 2024-10-15 | End: 2024-10-20

## 2024-10-15 ASSESSMENT — ENCOUNTER SYMPTOMS
DIARRHEA: 0
CONSTITUTIONAL NEGATIVE: 1
RESPIRATORY NEGATIVE: 1
VOMITING: 0
ABDOMINAL PAIN: 1
CARDIOVASCULAR NEGATIVE: 1
BACK PAIN: 1
NAUSEA: 0
FLANK PAIN: 0

## 2024-10-15 ASSESSMENT — FIBROSIS 4 INDEX: FIB4 SCORE: 0.59

## 2024-10-18 LAB
BACTERIA UR CULT: ABNORMAL
BACTERIA UR CULT: ABNORMAL
SIGNIFICANT IND 70042: ABNORMAL
SITE SITE: ABNORMAL
SOURCE SOURCE: ABNORMAL

## 2024-11-19 ENCOUNTER — OFFICE VISIT (OUTPATIENT)
Dept: MEDICAL GROUP | Facility: LAB | Age: 39
End: 2024-11-19
Payer: COMMERCIAL

## 2024-11-19 VITALS
WEIGHT: 206.4 LBS | BODY MASS INDEX: 34.39 KG/M2 | RESPIRATION RATE: 14 BRPM | HEIGHT: 65 IN | HEART RATE: 94 BPM | DIASTOLIC BLOOD PRESSURE: 74 MMHG | SYSTOLIC BLOOD PRESSURE: 128 MMHG | OXYGEN SATURATION: 96 % | TEMPERATURE: 97 F

## 2024-11-19 DIAGNOSIS — F41.1 GENERALIZED ANXIETY DISORDER WITH PANIC ATTACKS: ICD-10-CM

## 2024-11-19 DIAGNOSIS — F41.0 PANIC ATTACKS: ICD-10-CM

## 2024-11-19 DIAGNOSIS — F51.01 PRIMARY INSOMNIA: ICD-10-CM

## 2024-11-19 DIAGNOSIS — F41.0 GENERALIZED ANXIETY DISORDER WITH PANIC ATTACKS: ICD-10-CM

## 2024-11-19 PROBLEM — Z30.40 ENCOUNTER FOR SURVEILLANCE OF CONTRACEPTIVES: Status: RESOLVED | Noted: 2023-08-18 | Resolved: 2024-11-19

## 2024-11-19 PROCEDURE — 3074F SYST BP LT 130 MM HG: CPT | Performed by: NURSE PRACTITIONER

## 2024-11-19 PROCEDURE — 99214 OFFICE O/P EST MOD 30 MIN: CPT | Performed by: NURSE PRACTITIONER

## 2024-11-19 PROCEDURE — 3078F DIAST BP <80 MM HG: CPT | Performed by: NURSE PRACTITIONER

## 2024-11-19 RX ORDER — BUSPIRONE HYDROCHLORIDE 7.5 MG/1
7.5 TABLET ORAL 3 TIMES DAILY PRN
Qty: 90 TABLET | Refills: 3 | Status: SHIPPED | OUTPATIENT
Start: 2024-11-19

## 2024-11-19 RX ORDER — TRAZODONE HYDROCHLORIDE 50 MG/1
50 TABLET, FILM COATED ORAL NIGHTLY
Qty: 30 TABLET | Refills: 3 | Status: SHIPPED | OUTPATIENT
Start: 2024-11-19

## 2024-11-19 ASSESSMENT — FIBROSIS 4 INDEX: FIB4 SCORE: 0.59

## 2024-11-19 NOTE — PROGRESS NOTES
"Chief Complaint   Patient presents with    Medication Management     Verbal consent was acquired by the patient to use [employer] ambient listening note generation during this visit Yes      HPI:  History of Present Illness  The patient presents for evaluation of anxiety.    She has been abstinent from alcohol for the past 4 months. Following her mother's passing 3 months ago, she began taking her mother's Xanax to cope with the loss. She was consuming 1 to 2 mg daily, split between morning and night doses. After discontinuing the medication, she experienced severe withdrawal symptoms, including stumbling and difficulty breathing. She is now seeking assistance to manage these symptoms.    She reports feeling shaky, experiencing throat tightness, and having panic attacks. She also mentions a sensation of wanting to escape her own body. She has previously taken hydroxyzine and is currently on Celexa 20 mg. She has not tried buspirone. She has used trazodone in the past without any adverse effects.    ROS:  As documented in history of present illness above    Exam:   /74 (BP Location: Right arm, Patient Position: Sitting, BP Cuff Size: Adult)   Pulse 94   Temp 36.1 °C (97 °F)   Resp 14   Ht 1.651 m (5' 5\")   Wt 93.6 kg (206 lb 6.4 oz)   SpO2 96%   BMI 34.35 kg/m²     Constitutional: Alert, no distress, well-groomed.  Skin: Warm, dry, good turgor, no rashes in visible areas.  Eye: Equal, round and reactive, conjunctiva clear, lids normal.  ENMT: Lips without lesions, good dentition, moist mucous membranes.  Neck: Trachea midline, no masses, no thyromegaly.  Respiratory: Unlabored respiratory effort, no cough.  MSK: Normal gait, moves all extremities.  Neuro: Grossly non-focal.   Psych: Alert and oriented x3, normal affect and mood.    Assessment / Plan:  Assessment & Plan  1. Anxiety.  She reports experiencing panic attacks and difficulty sleeping after discontinuing Xanax, which she had been using to cope " with her mother's passing. Symptoms include shakiness, tightness in the throat, and trouble with body functions. BuSpar (buspirone) will be prescribed to manage panic symptoms as needed. Trazodone will be prescribed to aid with sleep. She will continue her current regimen of Celexa 20 mg.    Follow-up  Return in 1 month for follow-up.

## 2024-12-08 ASSESSMENT — ENCOUNTER SYMPTOMS: SLEEP DISTURBANCE: 1

## 2024-12-11 ENCOUNTER — OFFICE VISIT (OUTPATIENT)
Dept: SLEEP MEDICINE | Facility: MEDICAL CENTER | Age: 39
End: 2024-12-11
Attending: STUDENT IN AN ORGANIZED HEALTH CARE EDUCATION/TRAINING PROGRAM
Payer: COMMERCIAL

## 2024-12-11 VITALS
SYSTOLIC BLOOD PRESSURE: 106 MMHG | BODY MASS INDEX: 33.15 KG/M2 | RESPIRATION RATE: 16 BRPM | WEIGHT: 199 LBS | HEIGHT: 65 IN | DIASTOLIC BLOOD PRESSURE: 62 MMHG | HEART RATE: 84 BPM | OXYGEN SATURATION: 95 %

## 2024-12-11 DIAGNOSIS — G47.19 EXCESSIVE DAYTIME SLEEPINESS: ICD-10-CM

## 2024-12-11 DIAGNOSIS — R06.83 SNORING: ICD-10-CM

## 2024-12-11 DIAGNOSIS — G47.33 OSA (OBSTRUCTIVE SLEEP APNEA): Primary | ICD-10-CM

## 2024-12-11 DIAGNOSIS — G47.30 SLEEP DISORDER BREATHING: ICD-10-CM

## 2024-12-11 PROCEDURE — 99204 OFFICE O/P NEW MOD 45 MIN: CPT | Performed by: STUDENT IN AN ORGANIZED HEALTH CARE EDUCATION/TRAINING PROGRAM

## 2024-12-11 PROCEDURE — 99211 OFF/OP EST MAY X REQ PHY/QHP: CPT | Performed by: STUDENT IN AN ORGANIZED HEALTH CARE EDUCATION/TRAINING PROGRAM

## 2024-12-11 PROCEDURE — 3078F DIAST BP <80 MM HG: CPT | Performed by: STUDENT IN AN ORGANIZED HEALTH CARE EDUCATION/TRAINING PROGRAM

## 2024-12-11 PROCEDURE — 3074F SYST BP LT 130 MM HG: CPT | Performed by: STUDENT IN AN ORGANIZED HEALTH CARE EDUCATION/TRAINING PROGRAM

## 2024-12-11 ASSESSMENT — FIBROSIS 4 INDEX: FIB4 SCORE: 0.59

## 2024-12-11 NOTE — PROGRESS NOTES
[facility] Consult Note     Date: 12/11/2024 / Time: 2:54 PM      Thank you for requesting a sleep medicine consultation on Virgen Das at the sleep center. Presents today with the chief complaints of snoring, gasping in sleep, witnessed apneas, occasional excessive daytime sleepiness. She is referred by [facility]  [address] [place] [address] for evaluation and treatment of obstructive sleep apnea.     HISTORY OF PRESENT ILLNESS:     Virgen Das is a 39 y.o. female with STEWART, obesity, insomnia, and moderate obstructive sleep apnea.  Presents to Sleep Clinic for for evaluation of sleep.    She states for years she has had difficulty getting quality night sleep.  She snores in her sleep.  In addition to the snoring she wakes up choking and gasping at times.  She has been told that she pauses in her breathing.  Overall finds her sleep nonrestorative.    She finds it difficult often to get to sleep at night.  She will occasionally use trazodone which helps.  During the day she can have low energy and brain fog.  She can be irritable at times.    She does occasionally wake with headaches.  In the past she has had sleep paralysis happen fairly frequently.    As per supplemental questionnaire to be scanned or imported into chart:    [place] Sleepiness Score: 10    Sleep Schedule  Bedtime: Weekday 930pm Weekend 930pm   Wake time: Weekday 6-7am Weekend 9-10 pm   Sleep-onset latency: 30 min to 1 hour   Awakenings from sleep: 2  Difficulty falling back asleep: No  Bedroom partner: yes   Naps: Yes if able will nap. In the past 3 times a week     DAYTIME SYMPTOMS:   Excessive daytime sleepiness: Yes  Daytime fatigue: Yes  Difficulty concentrating: Yes  Memory problems: Yes  Irritability:Yes  Work/school performance issues: No  Sleepiness with driving: No   Caffeine/stimulant use: tea  Alcohol use:No     SLEEP RELATED SYMPTOMS  Snoring: Yes  Witnessed apnea  "or gasping/choking: Yes  Dry mouth or mouth breathing: Yes  Sweating: No   Teeth grinding/biting: Yes  Morning headaches: Yes  Refreshed Upon Awakening: No      SLEEP RELATED BEHAVIORS:  Parasomnias (walking, talking, eating, violence): talking, talking   Leg kicking: No   Restless legs - \"urge to move\": No   Nightmares: No  Recurrent: No   Dream enactment: No      NARCOLEPSY:  Cataplexy: No   Sleep paralysis: in the past has happened frequently  Sleep attacks: No   Hypnagogic/hypnopompic hallucinations: No     MEDICAL HISTORY  Past Medical History:   Diagnosis Date    Anxiety     Apnea, sleep     Daytime sleepiness     Depression     Encounter for surveillance of contraceptives 2023    Gasping for breath     GERD (gastroesophageal reflux disease)     Insomnia     Migraines     Morning headache     Seizure (HCC)     Snoring         SURGICAL HISTORY  No past surgical history on file.     FAMILY HISTORY  Family History   Problem Relation Age of Onset    Drug abuse Father     Other Father     No Known Problems Sister     Breast Cancer Maternal Grandmother     Cancer Maternal Grandmother     Diabetes Paternal Grandfather        SOCIAL HISTORY  Social History     Socioeconomic History    Marital status:     Highest education level: Associate degree: occupational, technical, or vocational program   Tobacco Use    Smoking status: Former     Current packs/day: 0.00     Average packs/day: 0.5 packs/day for 16.0 years (8.0 ttl pk-yrs)     Types: Cigarettes     Start date: 2000     Quit date: 2016     Years since quittin.9    Smokeless tobacco: Never   Vaping Use    Vaping status: Some Days    Last attempt to quit: 2023    Substances: Nicotine, Flavoring    Devices: Disposable   Substance and Sexual Activity    Alcohol use: Not Currently     Alcohol/week: 4.8 oz     Types: 8 Cans of beer per week     Comment: 3 (24 oz) cans of [employer] Lemonade nightly    Drug use: Not Currently     Comment: thc "    Sexual activity: Yes     Partners: Male     Birth control/protection: Condom, Pill     Comment:      Social Drivers of Health     Financial Resource Strain: Low Risk  (3/5/2024)    Overall Financial Resource Strain (CARDIA)     Difficulty of Paying Living Expenses: Not very hard   Food Insecurity: No Food Insecurity (3/5/2024)    Hunger Vital Sign     Worried About Running Out of Food in the Last Year: Never true     Ran Out of Food in the Last Year: Never true   Transportation Needs: No Transportation Needs (3/5/2024)    PRAPARE - Transportation     Lack of Transportation (Medical): No     Lack of Transportation (Non-Medical): No   Physical Activity: Sufficiently Active (3/5/2024)    Exercise Vital Sign     Days of Exercise per Week: 3 days     Minutes of Exercise per Session: 50 min   Stress: No Stress Concern Present (3/5/2024)    [employer] [place] [employer] - Occupational Stress Questionnaire     Feeling of Stress : Only a little   Social Connections: Unknown (3/5/2024)    Social Connection and Isolation Panel [NHANES]     Frequency of Communication with Friends and Family: More than three times a week     Frequency of Social Gatherings with Friends and Family: Twice a week     Attends Rastafarian Services: Patient declined     Active Member of Clubs or Organizations: No     Attends Club or Organization Meetings: Patient declined     Marital Status:    Housing Stability: Low Risk  (3/5/2024)    Housing Stability Vital Sign     Unable to Pay for Housing in the Last Year: No     Number of Places Lived in the Last Year: 1     Unstable Housing in the Last Year: No        Occupation: Homemaker     CURRENT MEDICATIONS  Current Outpatient Medications   Medication Sig Dispense Refill    busPIRone (BUSPAR) 7.5 MG tablet Take 1 Tablet by mouth 3 times a day as needed (panic attacks). 90 Tablet 3    traZODone (DESYREL) 50 MG Tab Take 1 Tablet by mouth every evening. 30 Tablet 3    ibuprofen  "(MOTRIN) 200 MG Tab Take 200 mg by mouth every 6 hours as needed.      citalopram (CELEXA) 20 MG Tab Take 1 Tablet by mouth every day. 30 Tablet 2     No current facility-administered medications for this visit.       REVIEW OF SYSTEMS  Constitutional: Denies fevers, Denies weight changes  Ears/Nose/Throat/Mouth: Denies nasal congestion or sore throat   Cardiovascular: Denies chest pain  Respiratory: Denies shortness of breath, Denies cough  Gastrointestinal/Hepatic: Denies nausea, vomiting  Sleep: see HPI    Physical Examination:  Vitals/ General Appearance:   Weight/BMI: Body mass index is 33.12 kg/m².  /62 (BP Location: Left arm, Patient Position: Sitting, BP Cuff Size: Adult)   Pulse 84   Resp 16   Ht 1.651 m (5' 5\")   Wt 90.3 kg (199 lb)   SpO2 95%   Vitals:    12/11/24 1457   BP: 106/62   BP Location: Left arm   Patient Position: Sitting   BP Cuff Size: Adult   Pulse: 84   Resp: 16   SpO2: 95%   Weight: 90.3 kg (199 lb)   Height: 1.651 m (5' 5\")       Pt. is alert and oriented to time, place and person. Cooperative and in no apparent distress.     Constitutional: Alert, no distress, well-groomed.  Skin: No rashes in visible areas.  Eye: Round. Conjunctiva clear, lids normal. No icterus.   ENT EXAM  Nasal alae/valves collapsible: No   Nasal septum deviation: Yes  Nasal turbinate hypertrophy: No  Hard palate narrow: No   Hard palate high: No   Soft palate/uvula (Mallampati score): 3  Tongue Scalloping: No   Retrognathia: No   Micrognathia: No   Cardiovascular:no murmus/gallops/rubs, normal S1 and S2 heart sounds, regular rate and rhythm  Pulmonary:Clear to auscultation, No wheezes, No crackles.  Neurologic:Awake, alert and oriented x 3, Normal age appropriate gait, No involuntary motions.  Extremities: No clubbing, cyanosis, or edema       ASSESSMENT AND PLAN   Virgen Das is a 39 y.o. female with STEWART, obesity, insomnia, and moderate obstructive sleep apnea.  Presents to Sleep Clinic for " for evaluation of sleep.    Obstructive sleep apnea  Virgen Das  has Obstructive Sleep Apnea (JOSE ENRIQUE). Virgen Das has symptoms of snoring, choking/gasping during sleep, witnessed apnea, morning headaches, unrefreshed upon awakening. These can interfere with activities of daily living.   ESS 10  Pt has risk factors for JOSE ENRIQUE include obesity and crowded oropharynx.     The pathophysiology of JOSE ENRIQUE and the increased risk of cardiovascular morbidity from untreated JOSE ENRIQUE is discussed in detail with the patient. She  also has STEWART which can be worsened by JOSE ENRIQUE.    Reviewed PSG with patient showing an AHI of 19,  and Min Oxygen saturation of 87%.  Time at or below 88% saturation 0.5 minutes  Based on sleep study and symptoms meets criteria for moderate obstructive sleep apnea.   We discussed the pathophysiology of obstructive sleep apnea (JOSE ENRIQUE) and risk factors for the disease. We also discussed possible consequences of untreated JOSE ENRIQUE, including excessive daytime sleepiness and fatigue, cognitive dysfunction, cardiovascular complications such as elevated blood pressure, heart attacks, cardiac arrhythmias, and strokes. We discussed how JOSE ENRIQUE typically gets worse with age. We discussed treatment options for JOSE ENRIQUE, including the gold standard therapy (PAP), alternative options such as a mandibular advancement device (custom-made oral appliances) and surgeries. We will proceed CPAP therapy.     RECOMMENDATIONS  -Start Auto CPAP at pressures 4-12 cm H2O  -Discussed importance of adherence/compliance   -Prescription generated for supplies   -Patient counseled to avoid driving when sleepy. Encouraged to anticipate sleepiness, consider taking a 10 min nap prior to driving, alternate with another , or pull over if sleepy while driving  -Advised to contact our office or myself with any questions via MyChart  -Follow up in 3 months or sooner if needed      Please note portions of this record was created using voice  recognition software. I have made every reasonable attempt to correct obvious errors, but I expect that there are errors of grammar and possibly content I did not discover before finalizing the note.